# Patient Record
Sex: FEMALE | Race: WHITE | NOT HISPANIC OR LATINO | URBAN - METROPOLITAN AREA
[De-identification: names, ages, dates, MRNs, and addresses within clinical notes are randomized per-mention and may not be internally consistent; named-entity substitution may affect disease eponyms.]

---

## 2020-02-13 ENCOUNTER — ANNUAL EXAM (OUTPATIENT)
Dept: OBGYN CLINIC | Facility: CLINIC | Age: 50
End: 2020-02-13
Payer: COMMERCIAL

## 2020-02-13 VITALS
DIASTOLIC BLOOD PRESSURE: 78 MMHG | BODY MASS INDEX: 23.7 KG/M2 | SYSTOLIC BLOOD PRESSURE: 118 MMHG | HEIGHT: 67 IN | WEIGHT: 151 LBS

## 2020-02-13 DIAGNOSIS — N92.0 MENORRHAGIA WITH REGULAR CYCLE: ICD-10-CM

## 2020-02-13 DIAGNOSIS — Z01.419 WOMEN'S ANNUAL ROUTINE GYNECOLOGICAL EXAMINATION: Primary | ICD-10-CM

## 2020-02-13 DIAGNOSIS — Z12.31 ENCOUNTER FOR SCREENING MAMMOGRAM FOR BREAST CANCER: ICD-10-CM

## 2020-02-13 DIAGNOSIS — N93.0 POSTCOITAL BLEEDING: ICD-10-CM

## 2020-02-13 PROCEDURE — S0610 ANNUAL GYNECOLOGICAL EXAMINA: HCPCS | Performed by: NURSE PRACTITIONER

## 2020-02-13 PROCEDURE — G0145 SCR C/V CYTO,THINLAYER,RESCR: HCPCS | Performed by: PATHOLOGY

## 2020-02-13 PROCEDURE — 88141 CYTOPATH C/V INTERPRET: CPT | Performed by: PATHOLOGY

## 2020-02-13 PROCEDURE — 87624 HPV HI-RISK TYP POOLED RSLT: CPT | Performed by: NURSE PRACTITIONER

## 2020-02-13 RX ORDER — TRANEXAMIC ACID 650 1/1
2 TABLET ORAL 3 TIMES DAILY
Qty: 60 TABLET | Refills: 0 | Status: SHIPPED | OUTPATIENT
Start: 2020-02-13 | End: 2020-02-23

## 2020-02-13 RX ORDER — FLUOXETINE HYDROCHLORIDE 20 MG/1
20 CAPSULE ORAL DAILY
COMMUNITY

## 2020-02-13 NOTE — PROGRESS NOTES
Subjective    HPI:     Lisa Mancuso is a 52 y o  female  She is a  4 Para 2, with 2 prior vaginal deliveries  Her menstrual cycles are regular and predictable  Most recently they have become heavier and longer in length  Last cycle lasted 3 weeks, just ended this week  She states her mother had a hysterectomy at 52 due to bleeding and her sister had an ablation at 46  Her current method of contraception includes tubal ligation    She complains of issues with intimacy, she has experienced postcoital bleeding since end of last year  Bleeding can be once or last for the day  She denies /GI complaints  She complains of a watery discharge with "odor"  Gyn complaints  She feels feels safe at home  She denies depression/anxiety  Medical, surgical and family history reviewed  Her dental care is not done-last cleaning was 3 years ago  She tries to eats a healthy diet and tries to exercise regularly  She is not happy with her weight  Gynecologic History    Patient's last menstrual period was 2020  Last Pap:   Results were: normal per patient  Last mammogram: has never had one    Obstetric History    OB History    Para Term  AB Living   4 2     2 2   SAB TAB Ectopic Multiple Live Births     2     2      # Outcome Date GA Lbr Jeb/2nd Weight Sex Delivery Anes PTL Lv   4 TAB            3 TAB            2 Para            1 Para                The following portions of the patient's history were reviewed and updated as appropriate: allergies, current medications, past family history, past medical history, past social history, past surgical history and problem list     Review of Systems    Pertinent items are noted in HPI  Objective    Physical Exam   Constitutional: Vital signs are normal  She appears well-developed and well-nourished  Genitourinary: Vagina normal and uterus normal  Pelvic exam was performed with patient supine   There is no rash, tenderness, lesion or Bartholin's cyst on the right labia  There is no rash, tenderness, lesion or Bartholin's cyst on the left labia  Right adnexum does not display mass, does not display tenderness and does not display fullness  Left adnexum does not display mass, does not display tenderness and does not display fullness  Cervix is not parous  Cervix exhibits friability  Cervix does not exhibit motion tenderness, lesion, discharge, polyp or nabothian cyst          Uterus is anteverted  Genitourinary Comments: The vagina is clean with no abnormal discharge appreciated on exam       HENT:   Head: Normocephalic and atraumatic  Neck: Neck supple  No thyromegaly present  Cardiovascular: Normal rate, regular rhythm, S1 normal, S2 normal and normal heart sounds  Pulmonary/Chest: Effort normal and breath sounds normal  Right breast exhibits no inverted nipple, no mass, no nipple discharge, no skin change and no tenderness  Left breast exhibits no inverted nipple, no mass, no nipple discharge, no skin change and no tenderness  Abdominal: Soft  Bowel sounds are normal  She exhibits no distension and no mass  There is no tenderness  There is no guarding  Lymphadenopathy:     She has no cervical adenopathy  She has no axillary adenopathy  Neurological: She is alert  Skin: Skin is warm  Psychiatric: She has a normal mood and affect  Nursing note and vitals reviewed  Assessment and Plan    Philippe Gonzalez was seen today for gynecologic exam, menstrual problem, postcoital bleeding and fatigue      Diagnoses and all orders for this visit:    Women's annual routine gynecological examination  -     Liquid-based pap, screening    Encounter for screening mammogram for breast cancer  -     Mammo screening bilateral w 3d & cad; Future    Postcoital bleeding  -     US pelvis complete w transvaginal; Future    Menorrhagia with regular cycle  -     Tranexamic Acid 650 MG TABS; Take 2 tablets by mouth 3 (three) times a day for 30 doses Starting with first day of period for a max of 5 days  Patient informed of a Stable GYN exam  A pap smear was performed  I have discussed the importance of exercise and healthy diet as well as adequate intake of calcium and vitamin D  The current ASCCP guidelines were reviewed  The low risk patient will receive pap smear screening every 3 years until the age of 34 and then every 3 to 5 years with HPV co-testing from the ages of 33-67  I emphasized the importance of an annual pelvic and breast exam      She has never had a baseline mammogram  I encouraged her to schedule an appt for screening  I discussed with patient, pending US results she may need an EBX for further evaluation  We also discussed the use of Lysteda during her menses to help control her bleeding  She is interested in trying  I reviewed the importance of dental health as it relates to heart health  All questions have been answered to her satisfaction  Mammogram ordered  Follow up in: pending US results

## 2020-02-14 LAB
HPV HR 12 DNA CVX QL NAA+PROBE: POSITIVE
HPV16 DNA CVX QL NAA+PROBE: NEGATIVE
HPV18 DNA CVX QL NAA+PROBE: NEGATIVE

## 2020-02-19 ENCOUNTER — TELEPHONE (OUTPATIENT)
Dept: OBGYN CLINIC | Facility: CLINIC | Age: 50
End: 2020-02-19

## 2020-02-19 LAB
LAB AP GYN PRIMARY INTERPRETATION: ABNORMAL
LAB AP LMP: ABNORMAL
Lab: ABNORMAL
PATH INTERP SPEC-IMP: ABNORMAL

## 2020-08-05 ENCOUNTER — TELEPHONE (OUTPATIENT)
Dept: OBGYN CLINIC | Facility: CLINIC | Age: 50
End: 2020-08-05

## 2020-08-05 NOTE — TELEPHONE ENCOUNTER
Called patient regarding missed appt regarding colposcopy, patient stated she needs to check her schedule and will call back to make appt

## 2020-09-15 ENCOUNTER — TELEPHONE (OUTPATIENT)
Dept: OBGYN CLINIC | Facility: CLINIC | Age: 50
End: 2020-09-15

## 2020-09-15 NOTE — TELEPHONE ENCOUNTER
----- Message from Leah Monsivais sent at 9/15/2020  1:45 PM EDT -----  Woody De can we follow-up on this patient regarding her colpo  I don't see an appt scheduled and she missed 4 appts  Thanks  ----- Message -----  From: Sondra Riedel  Sent: 8/5/2020   7:48 AM EDT  To: LIZA Monsivais    I just wanted you to know that the patient has missed 4 appts for her colposcopy  I spoke to her today and she stated she would call back to reschedule  You might want to keep this on your radar and we may need to send her a letter if she doesn't make an appt

## 2020-09-29 NOTE — TELEPHONE ENCOUNTER
Pt had called on 9/28/2020 @ 5:19 pm & lm as re: she rec'd ph call from office & stated she was "not planning to reschedule appt for colposcopy at this time"  Recalled pt & lm as

## 2021-06-23 NOTE — TELEPHONE ENCOUNTER
Lm pt's as re: rescheduling appt for colposcopy f/u abn pap 2/13/2020 (ASCUS, (+) HPV), also last yearly 2/2020